# Patient Record
Sex: MALE | Race: WHITE
[De-identification: names, ages, dates, MRNs, and addresses within clinical notes are randomized per-mention and may not be internally consistent; named-entity substitution may affect disease eponyms.]

---

## 2019-04-29 ENCOUNTER — HOSPITAL ENCOUNTER (EMERGENCY)
Dept: HOSPITAL 10 - FTE | Age: 49
Discharge: HOME | End: 2019-04-29
Payer: COMMERCIAL

## 2019-04-29 ENCOUNTER — HOSPITAL ENCOUNTER (EMERGENCY)
Dept: HOSPITAL 91 - FTE | Age: 49
Discharge: HOME | End: 2019-04-29
Payer: COMMERCIAL

## 2019-04-29 VITALS
WEIGHT: 228.4 LBS | HEIGHT: 72 IN | WEIGHT: 228.4 LBS | BODY MASS INDEX: 30.94 KG/M2 | HEIGHT: 72 IN | BODY MASS INDEX: 30.94 KG/M2

## 2019-04-29 VITALS — HEART RATE: 98 BPM | SYSTOLIC BLOOD PRESSURE: 148 MMHG | RESPIRATION RATE: 18 BRPM | DIASTOLIC BLOOD PRESSURE: 82 MMHG

## 2019-04-29 DIAGNOSIS — J18.1: ICD-10-CM

## 2019-04-29 DIAGNOSIS — M54.6: Primary | ICD-10-CM

## 2019-04-29 PROCEDURE — 96372 THER/PROPH/DIAG INJ SC/IM: CPT

## 2019-04-29 PROCEDURE — 71046 X-RAY EXAM CHEST 2 VIEWS: CPT

## 2019-04-29 PROCEDURE — 99284 EMERGENCY DEPT VISIT MOD MDM: CPT

## 2019-04-29 PROCEDURE — 87400 INFLUENZA A/B EACH AG IA: CPT

## 2019-04-29 RX ADMIN — CEFTRIAXONE SODIUM 1 GM: 1 INJECTION, POWDER, FOR SOLUTION INTRAMUSCULAR; INTRAVENOUS at 09:42

## 2019-04-29 RX ADMIN — CYCLOBENZAPRINE 1 MG: 10 TABLET, FILM COATED ORAL at 07:39

## 2019-04-29 RX ADMIN — ACETAMINOPHEN 1 MG: 500 TABLET, FILM COATED ORAL at 07:40

## 2019-04-29 RX ADMIN — LIDOCAINE HYDROCHLORIDE 1 ML: 10 INJECTION, SOLUTION EPIDURAL; INFILTRATION; INTRACAUDAL; PERINEURAL at 09:42

## 2019-04-29 NOTE — ERD
ER Documentation


Chief Complaint


Chief Complaint





upper back pain x1wk, cough x3 days





HPI


History of Present Illness: 49-year-old male with past medical history coming in


today with complaint of right upper back pain is been present for 1 week, 


nonproductive cough for the past 3 days, 8 days of fever with T-max of 104.0.  


Patient also has associated symptoms including decreased appetite and fatigue 


and chills and sweats.  Patient denies shortness of breath or chest pain.





At home pharmacological/nonpharmacological treatment for symptoms: Advil at 5 AM


this morning; also at last night at 8 PM





Denies social concerns; Denies recent foreign travel





ROS


All systems reviewed and are negative except as per history of present illness.





Medications


Home Meds


Active Scripts


Ibuprofen* (Motrin*) 600 Mg Tab, 600 MG PO Q6H PRN for PAIN AND OR ELEVATED 


TEMP, #30 TAB


   Prov:KARLIE SANON NP         4/29/19


Doxycycline Hyclate* (Doxycycline Hyclate*) 100 Mg Tablet.dr, 100 MG PO BID for 


PNEUMONIA for 7 Days, TAB


   Prov:KARLIE SANON NP         4/29/19





Allergies


Allergies:  


Coded Allergies:  


     No Known Allergy (Unverified , 4/29/19)





PMhx/Soc


Medical and Surgical Hx:  pt denies Medical Hx, pt denies Surgical Hx


Hx Alcohol Use:  No


Hx Substance Use:  No


Hx Tobacco Use:  No


Smoking Status:  Never smoker





FmHx


Family History:  No diabetes, No coronary disease





Physical Exam


Vitals





Vital Signs


  Date      Temp  Pulse  Resp  B/P (MAP)   Pulse Ox  O2          O2 Flow    FiO2


Time                                                 Delivery    Rate


   4/29/19  99.5     98    18      148/82        98  Room Air


     09:47                          (104)


   4/29/19  99.5    104    18      155/79        98


     07:07                          (104)





Physical Exam


Const:   No acute distress


Head:   Atraumatic 


Eyes:    Normal Conjunctiva


ENT:    Normal External Ears, Nose and Mouth.


Neck:               Full range of motion. No meningismus.


Resp:   Diminished breath sounds to right  lung to auscultation; left lung clear


without wheezing, rhonchi, rales.  


Cardio:   Regular rate and rhythm, no murmurs


Abd:    Soft, non tender, non distended. Normal bowel sounds


Skin:   No petechiae or rashes


Back:   No midline or flank tenderness.  Tenderness to palpation over muscles 


over right and left sides of back.


Ext:    No cyanosis, or edema


Neur:   Awake and alert


Psych:    Normal Mood and Affect


Results 24 hrs





Current Medications


 Medications
   Dose
          Sig/Judie
       Start Time
   Status  Last


 (Trade)       Ordered        Route
 PRN     Stop Time              Admin
Dose


                              Reason                                Admin


                1,000 mg       ONCE  STAT
    4/29/19       DC           4/29/19


Acetaminophen                 PO
            07:29
                       07:40




  (Tylenol                                  4/29/19 07:33


Tab)


 Tramadol       50 mg          ONCE  ONCE
    4/29/19       DC           4/29/19


HCl
                          PO
            07:30
                       07:39



(Ultram)                                     4/29/19 07:33


                10 mg          ONCE  ONCE
    4/29/19       DC           4/29/19


Cyclobenzapri                 PO
            07:30
                       07:39



ne
 HCl
                                     4/29/19 07:33


(Flexeril)


 Ceftriaxone    1 gm           ONCE  ONCE
    4/29/19       DC           4/29/19


Sodium
                       IM
            09:30
                       09:42



(Rocephin)                                   4/29/19 09:31


 Lidocaine
     2.1 ml         ONCE  ONCE
    4/29/19       DC           4/29/19


(Xylocaine                    INFIL
         09:30
                       09:42



1%
  (Mpf))                                  4/29/19 09:31








Procedures/MDM


ED course includes a thorough examination and history. 


Medications: Acetaminophen, cyclobenzaprine, tramadol


Imaging: Chest x-ray


Labs: Influenza





Patient reassessment: No changes in or be back pain with medications given.





Low suspicion for life-threatening medical emergency.  Low suspicion for 


coronary pulmonary emergency requires hospitalization or immediate surgical 


intervention.  Low suspicion for infectious process that requires 


hospitalization or IV antibiotics.





Otherwise healthy patient presenting with constellation of symptoms likely 


representing pneumonia as characterized by history, physical exam findings, lab 


findings, radiologic finding.





No respiratory distress, otherwise relatively well appearing and nontoxic.  


Patient hemodynamically stable.  Will give IM dose of ceftriaxone before 


discharge.  Patient educated on diagnoses, prescriptions, follow-up care, return


precautions.  Strict return precautions given for worsening condition; questions


answered discharge.





Disposition for discharge with followup in 2 days with PCP/clinic.





Departure


Diagnosis:  


   Primary Impression:  


   Pain, upper back


   Additional Impression:  


   Right lower lobe pneumonia


   Pneumonia type:  due to unspecified organism  Qualified Codes:  J18.1 - Lobar


   pneumonia, unspecified organism


Condition:  Stable


Patient Instructions:  Pneumonia (Adult)


Referrals:  


COMM[facility]ITY CLINICS


YOU HAVE RECEIVED A MEDICAL SCREENING EXAM AND THE RESULTS INDICATE THAT YOU DO 


NOT HAVE A CONDITION THAT REQUIRES URGENT TREATMENT IN THE EMERGENCY DEPARTMENT.





FURTHER EVALUATION AND TREATMENT OF YOUR CONDITION CAN WAIT UNTIL YOU ARE SEEN 


IN YOUR DOCTORS OFFICE WITHIN THE NEXT 1-2 DAYS. IT IS YOUR RESPONSIBILITY TO 


MAKE AN APPOINTMENT FOR FOLOW-UP CARE.





IF YOU HAVE A PRIMARY DOCTOR


--you should call your primary doctor and schedule an appointment





IF YOU DO NOT HAVE A PRIMARY DOCTOR YOU CAN CALL OUR PHYSICIAN REFERRAL HOTLINE 


AT


 [phone] 





IF YOU CAN NOT AFFORD TO SEE A PHYSICIAN YOU CAN CHOSE FROM THE FOLLOWING 


[facility] [phone] [address] [facility] [phone] [address] [facility]. [facility] [phone] [address] [facility] [phone] [address] [facility] [phone] [address] [facility]. [facility] [phone] [address] [facility]. [facility]


YOU HAVE RECEIVED A MEDICAL SCREENING EXAM AND THE RESULTS INDICATE THAT YOU DO 


NOT HAVE A CONDITION THAT REQUIRES URGENT TREATMENT IN THE EMERGENCY DEPARTMENT.





FURTHER EVALUATION AND TREATMENT OF YOUR CONDITION CAN WAIT UNTIL YOU ARE SEEN 


IN YOUR DOCTORS OFFICE WITHIN THE NEXT 1-2 DAYS. IT IS YOUR RESPONSIBILITY TO 


MAKE AN APPOINTMENT FOR FOLOW-UP CARE.





IF YOU HAVE A PRIMARY DOCTOR


--you should call your primary doctor and schedule and appointment





IF YOU DO NOT HAVE A PRIMARY DOCTOR YOU CAN CALL OUR PHYSICIAN REFERRAL HOTLINE 


AT [phone].





IF YOU CAN NOT AFFORD TO SEE A PHYSICIAN YOU CAN CHOSE FROM THE FOLLOWING [facility]


INSTITUTIONS:





[facility]


[address] [place] [address]





[facility]


[address] [place] [address]





[facility]


[address] [place] [address]





Additional Instructions:  


Thank you very much for allowing us to participate in your care. 


Your health and safety is our top priority at [facility].  It 


is important to read all discharge instructions and education provided in your 


discharge packet.





Call your primary care doctor TOMORROW for an appointment during the next 2-4 


days and bring all the information and medications prescribed. 





Have prescriptions filled and follow precisely the directions on the label. 


-Doxycycline Is an antibiotic; take this medication every day as listed on your 


prescription.  You must complete the entire course of treatment that is listed 


on your prescription this is very important because it takes a certain number of


days to kill the bacteria that is causing the infection.


-Ibuprofen is a pain/anti-inflammatory medication.  This medication can also 


help with any fever.  Take this medication as prescribed.  This can help with 


the pain associated with your back as well.





If the symptoms get worse and your provider is unavailable, return to the 


Emergency Department immediately.











KARLIE SANON NP            Apr 29, 2019 14:37